# Patient Record
Sex: MALE | Race: OTHER | NOT HISPANIC OR LATINO | URBAN - METROPOLITAN AREA
[De-identification: names, ages, dates, MRNs, and addresses within clinical notes are randomized per-mention and may not be internally consistent; named-entity substitution may affect disease eponyms.]

---

## 2018-11-23 ENCOUNTER — OUTPATIENT (OUTPATIENT)
Dept: OUTPATIENT SERVICES | Facility: HOSPITAL | Age: 23
LOS: 1 days | End: 2018-11-23

## 2018-11-23 VITALS
OXYGEN SATURATION: 98 % | SYSTOLIC BLOOD PRESSURE: 100 MMHG | RESPIRATION RATE: 16 BRPM | WEIGHT: 212.08 LBS | HEART RATE: 65 BPM | TEMPERATURE: 99 F | HEIGHT: 76 IN | DIASTOLIC BLOOD PRESSURE: 60 MMHG

## 2018-11-23 DIAGNOSIS — K40.30 UNILATERAL INGUINAL HERNIA, WITH OBSTRUCTION, WITHOUT GANGRENE, NOT SPECIFIED AS RECURRENT: ICD-10-CM

## 2018-11-23 LAB
HCT VFR BLD CALC: 45.2 % — SIGNIFICANT CHANGE UP (ref 39–50)
HGB BLD-MCNC: 15.1 G/DL — SIGNIFICANT CHANGE UP (ref 13–17)
MCHC RBC-ENTMCNC: 29.2 PG — SIGNIFICANT CHANGE UP (ref 27–34)
MCHC RBC-ENTMCNC: 33.4 % — SIGNIFICANT CHANGE UP (ref 32–36)
MCV RBC AUTO: 87.4 FL — SIGNIFICANT CHANGE UP (ref 80–100)
NRBC # FLD: 0 — SIGNIFICANT CHANGE UP
PLATELET # BLD AUTO: 237 K/UL — SIGNIFICANT CHANGE UP (ref 150–400)
PMV BLD: 11.9 FL — SIGNIFICANT CHANGE UP (ref 7–13)
RBC # BLD: 5.17 M/UL — SIGNIFICANT CHANGE UP (ref 4.2–5.8)
RBC # FLD: 12.8 % — SIGNIFICANT CHANGE UP (ref 10.3–14.5)
WBC # BLD: 8.17 K/UL — SIGNIFICANT CHANGE UP (ref 3.8–10.5)
WBC # FLD AUTO: 8.17 K/UL — SIGNIFICANT CHANGE UP (ref 3.8–10.5)

## 2018-11-23 RX ORDER — SODIUM CHLORIDE 9 MG/ML
1000 INJECTION, SOLUTION INTRAVENOUS
Qty: 0 | Refills: 0 | Status: DISCONTINUED | OUTPATIENT
Start: 2018-11-28 | End: 2018-11-29

## 2018-11-23 NOTE — H&P PST ADULT - GASTROINTESTINAL DETAILS
no bruit/no rebound tenderness/soft/bowel sounds normal/no rigidity/no guarding/no organomegaly/no distention/nontender

## 2018-11-23 NOTE — H&P PST ADULT - GASTROINTESTINAL COMMENTS
Pain to Rt groin stated at times DX: unilateral inguinal hernia, with obstruction, without gangrene not specified as recurrent.

## 2018-11-23 NOTE — H&P PST ADULT - NEGATIVE GASTROINTESTINAL SYMPTOMS
no constipation/no flatulence/no abdominal pain/no melena/no steatorrhea/no nausea/no vomiting/no hematochezia/no hiccoughs/no change in bowel habits/no diarrhea/no jaundice

## 2018-11-23 NOTE — H&P PST ADULT - PROBLEM SELECTOR PLAN 1
Pt is Scheduled for a right IHR with mesh on 11/28/18. Preop instructions provided including NPO status, Pepcid, hcp and Hibiclens. Verbalized understanding.

## 2018-11-23 NOTE — H&P PST ADULT - HISTORY OF PRESENT ILLNESS
23 y/o male. states was at work and after lifting a heavy object felt a lump on his rt groin and became painful Pt went see an internist who referred him to Dr Mendoza who evaluate him, was diagnosed with an IH and recommended surgery at this time.   Preop dx: unilateral inguinal hernia, with obstruction, without gangrene not specified as recurrent. 21 y/o male. states was at work and after lifting a heavy object felt a lump on his rt groin and became painful Pt went see an internist who referred him to Dr Mendoza who evaluate him, was diagnosed with an IH and recommended surgery at this time.   Preop dx: unilateral inguinal hernia, with obstruction, without gangrene not specified as recurrent. Scheduled for a right IHR with mesh on 11/28/18.

## 2018-11-23 NOTE — H&P PST ADULT - NSANTHOSAYNRD_GEN_A_CORE
never tested/No. MARCK screening performed.  STOP BANG Legend: 0-2 = LOW Risk; 3-4 = INTERMEDIATE Risk; 5-8 = HIGH Risk

## 2018-11-28 ENCOUNTER — OUTPATIENT (OUTPATIENT)
Dept: OUTPATIENT SERVICES | Facility: HOSPITAL | Age: 23
LOS: 1 days | Discharge: HOME | End: 2018-11-28

## 2018-11-28 VITALS
SYSTOLIC BLOOD PRESSURE: 129 MMHG | OXYGEN SATURATION: 100 % | HEART RATE: 85 BPM | DIASTOLIC BLOOD PRESSURE: 76 MMHG | RESPIRATION RATE: 15 BRPM

## 2018-11-28 VITALS
WEIGHT: 210.1 LBS | OXYGEN SATURATION: 98 % | HEART RATE: 82 BPM | HEIGHT: 76 IN | SYSTOLIC BLOOD PRESSURE: 123 MMHG | DIASTOLIC BLOOD PRESSURE: 70 MMHG | RESPIRATION RATE: 14 BRPM | TEMPERATURE: 99 F

## 2018-11-28 DIAGNOSIS — K40.30 UNILATERAL INGUINAL HERNIA, WITH OBSTRUCTION, WITHOUT GANGRENE, NOT SPECIFIED AS RECURRENT: ICD-10-CM

## 2018-11-28 RX ADMIN — SODIUM CHLORIDE 30 MILLILITER(S): 9 INJECTION, SOLUTION INTRAVENOUS at 19:07

## 2018-11-28 RX ADMIN — SODIUM CHLORIDE 30 MILLILITER(S): 9 INJECTION, SOLUTION INTRAVENOUS at 15:40

## 2018-11-28 NOTE — ASU DISCHARGE PLAN (ADULT/PEDIATRIC). - NOTIFY
Bleeding that does not stop Fever greater than 101/Inability to Tolerate Liquids or Foods/Unable to Urinate/Bleeding that does not stop/Swelling that continues/Persistent Nausea and Vomiting/Pain not relieved by Medications

## 2018-11-28 NOTE — ASU DISCHARGE PLAN (ADULT/PEDIATRIC). - MEDICATION SUMMARY - MEDICATIONS TO TAKE
I will START or STAY ON the medications listed below when I get home from the hospital:    Percocet 10/325 oral tablet  -- 1 tab(s) by mouth every 6 hours MDD:4  -- Caution federal law prohibits the transfer of this drug to any person other  than the person for whom it was prescribed.  May cause drowsiness.  Alcohol may intensify this effect.  Use care when operating dangerous machinery.  This prescription cannot be refilled.  This product contains acetaminophen.  Do not use  with any other product containing acetaminophen to prevent possible liver damage.  Using more of this medication than prescribed may cause serious breathing problems.    -- Indication: For Pain    Augmentin 875 mg-125 mg oral tablet  -- 1 tab(s) by mouth 2 times a day MDD:2  -- Finish all this medication unless otherwise directed by prescriber.  Take with food or milk.    -- Indication: For post-op infection prevention

## 2018-11-28 NOTE — ASU DISCHARGE PLAN (ADULT/PEDIATRIC). - COMMENTS
Surgical Unit will call you on the next business day to follow up. [facility] [place] is open Monday - Friday.

## 2018-11-28 NOTE — BRIEF OPERATIVE NOTE - PROCEDURE
<<-----Click on this checkbox to enter Procedure Inguinal hernia repair, right  11/28/2018    Active  [id number]

## 2018-11-28 NOTE — ASU DISCHARGE PLAN (ADULT/PEDIATRIC). - SPECIAL INSTRUCTIONS
Follow-up with Dr. Segura in 1 week.  Take aumentin 875 twice a day for one week.  For pain, you may take percocet as prescribed.  No heavy lifting. You may shower tomorrow.

## 2018-11-28 NOTE — BRIEF OPERATIVE NOTE - OPERATION/FINDINGS
R indirect inguinal hernia. thin hernia sac transected. Proximal end tied; distal end going into scrotum left untied.  Mesh placed. Closed with vicryl sutures and dressed with steri stips.

## 2018-11-28 NOTE — ASU DISCHARGE PLAN (ADULT/PEDIATRIC). - NURSING INSTRUCTIONS
See medication reconciliation record  You were given an antibiotic ( Cefazolin 2000mg ) in OR today about 4:15pm  You were given Toradol for pain management. Please DO Not take Motrin/Ibuprofen (NSAIDS) for the next 6 hours (Until  midnight ).   You were given IV Tylenol for pain management.  Please DO NOT take tylenol for the next 6-8 hours (after  11:45pm ). Please do not exceed 3000mg in 24hours. See medication reconciliation record  You were given an antibiotic ( Cefazolin 2000mg ) in OR today about 4:15pm  When taking pain meds - take with food and know it may cause constipation. To prevent constipation increase fluids and fiber in diet. - Do NOT drive while on narcotics. Do not mix tylenol (acetaminophen) containing products.   You were given Toradol for pain management. Please DO Not take Motrin/Ibuprofen (NSAIDS) for the next 6 hours (Until  midnight ).   You were given IV Tylenol for pain management.  Please DO NOT take tylenol for the next 6-8 hours (after  11:45pm ). Please do not exceed 3000mg in 24hours.

## 2018-11-28 NOTE — BRIEF OPERATIVE NOTE - POST-OP DX
Inguinal hernia of right side without obstruction or gangrene  11/28/2018    Active  Christelle Huerta

## 2018-11-28 NOTE — ASU DISCHARGE PLAN (ADULT/PEDIATRIC). - ACTIVITY LEVEL
Pt provided GoodRx prescriptions for cream and states they can afford to pay the $40.    no heavy lifting no heavy lifting/no sports/gym/no exercise